# Patient Record
Sex: MALE | URBAN - METROPOLITAN AREA
[De-identification: names, ages, dates, MRNs, and addresses within clinical notes are randomized per-mention and may not be internally consistent; named-entity substitution may affect disease eponyms.]

---

## 2022-02-11 ENCOUNTER — OFFICE VISIT (OUTPATIENT)
Dept: URBAN - METROPOLITAN AREA CLINIC 91 | Facility: CLINIC | Age: 62
End: 2022-02-11
Payer: COMMERCIAL

## 2022-02-11 DIAGNOSIS — H10.823 ROSACEA CONJUNCTIVITIS, BILATERAL: Primary | ICD-10-CM

## 2022-02-11 DIAGNOSIS — H04.123 DRY EYE SYNDROME OF BILATERAL LACRIMAL GLANDS: ICD-10-CM

## 2022-02-11 PROCEDURE — 99204 OFFICE O/P NEW MOD 45 MIN: CPT | Performed by: OPHTHALMOLOGY

## 2022-02-11 RX ORDER — DOXYCYCLINE HYCLATE 100 MG/1
100 MG TABLET, COATED ORAL
Qty: 60 | Refills: 2 | Status: ACTIVE
Start: 2022-02-11

## 2022-02-11 RX ORDER — LOTEPREDNOL ETABONATE 5 MG/ML
0.5 % SUSPENSION/ DROPS OPHTHALMIC
Qty: 10 | Refills: 1 | Status: ACTIVE
Start: 2022-02-11

## 2022-02-11 NOTE — IMPRESSION/PLAN
Impression: Rosacea conjunctivitis, bilateral: E40.761. Plan: Patient has facial findings consistent with Rosacea which include lid margin findings and conjunctival inflammation. doxycycline 100mg BID PO and taper according to findings. Patient also to see his dermatologist feb 24 to discuss further treatment options
lotemax qid AT's PF 4-8x per day